# Patient Record
Sex: FEMALE | Race: OTHER | HISPANIC OR LATINO
[De-identification: names, ages, dates, MRNs, and addresses within clinical notes are randomized per-mention and may not be internally consistent; named-entity substitution may affect disease eponyms.]

---

## 2019-04-29 PROBLEM — Z00.00 ENCOUNTER FOR PREVENTIVE HEALTH EXAMINATION: Status: ACTIVE | Noted: 2019-04-29

## 2019-05-02 ENCOUNTER — APPOINTMENT (OUTPATIENT)
Dept: ANTEPARTUM | Facility: CLINIC | Age: 29
End: 2019-05-02

## 2019-05-15 ENCOUNTER — APPOINTMENT (OUTPATIENT)
Dept: ANTEPARTUM | Facility: CLINIC | Age: 29
End: 2019-05-15

## 2019-06-24 ENCOUNTER — APPOINTMENT (OUTPATIENT)
Dept: ANTEPARTUM | Facility: CLINIC | Age: 29
End: 2019-06-24

## 2021-08-09 ENCOUNTER — APPOINTMENT (OUTPATIENT)
Dept: ANTEPARTUM | Facility: CLINIC | Age: 31
End: 2021-08-09
Payer: MEDICAID

## 2021-08-09 ENCOUNTER — APPOINTMENT (OUTPATIENT)
Dept: MATERNAL FETAL MEDICINE | Facility: CLINIC | Age: 31
End: 2021-08-09
Payer: MEDICAID

## 2021-08-09 ENCOUNTER — ASOB RESULT (OUTPATIENT)
Age: 31
End: 2021-08-09

## 2021-08-09 VITALS
SYSTOLIC BLOOD PRESSURE: 112 MMHG | OXYGEN SATURATION: 98 % | WEIGHT: 180 LBS | DIASTOLIC BLOOD PRESSURE: 76 MMHG | HEIGHT: 64 IN | BODY MASS INDEX: 30.73 KG/M2 | HEART RATE: 84 BPM | RESPIRATION RATE: 16 BRPM

## 2021-08-09 DIAGNOSIS — Z80.3 FAMILY HISTORY OF MALIGNANT NEOPLASM OF BREAST: ICD-10-CM

## 2021-08-09 DIAGNOSIS — Z78.9 OTHER SPECIFIED HEALTH STATUS: ICD-10-CM

## 2021-08-09 DIAGNOSIS — O99.212 OBESITY COMPLICATING PREGNANCY, SECOND TRIMESTER: ICD-10-CM

## 2021-08-09 DIAGNOSIS — Z3A.20 20 WEEKS GESTATION OF PREGNANCY: ICD-10-CM

## 2021-08-09 DIAGNOSIS — O09.292 SUPERVISION OF PREGNANCY WITH OTHER POOR REPRODUCTIVE OR OBSTETRIC HISTORY, SECOND TRIMESTER: ICD-10-CM

## 2021-08-09 PROCEDURE — 76817 TRANSVAGINAL US OBSTETRIC: CPT

## 2021-08-09 PROCEDURE — 76805 OB US >/= 14 WKS SNGL FETUS: CPT

## 2021-08-09 PROCEDURE — 99205 OFFICE O/P NEW HI 60 MIN: CPT

## 2021-08-09 RX ORDER — VITAMIN A, VITAMIN C, VITAMIN D-3, VITAMIN E, VITAMIN B-1, VITAMIN B-2, NIACIN, VITAMIN B-6, CALCIUM, IRON, ZINC, COPPER 4000; 120; 400; 22; 1.84; 3; 20; 10; 1; 12; 200; 27; 25; 2 [IU]/1; MG/1; [IU]/1; MG/1; MG/1; MG/1; MG/1; MG/1; MG/1; UG/1; MG/1; MG/1; MG/1; MG/1
27-1 TABLET ORAL
Qty: 30 | Refills: 0 | Status: ACTIVE | COMMUNITY
Start: 2021-07-21

## 2021-08-09 NOTE — DISCUSSION/SUMMARY
[FreeTextEntry1] : She is 20 weeks and 2 days gestation by her last menstrual period dates.\par \par She is overweight and obesity has been associated with a number of maternal complications such as gestational diabetes, pre-eclampsia, thrombophlebitis, labor abnormalities, post-term pregnancies,  delivery, and operative complications. Obesity has been associated with adverse fetal outcomes such as late stillbirth and  deliveries.  Obese women also have a two to three-fold increased incidence in congenital anomalies. There were no major fetal malformations seen during today's fetal anatomy ultrasound examination.  \par \par She presents because of a history of a second trimester spontaneous  at approximately 16 weeks gestation. No sonogram done before the pregnancy loss to confirm gestational age.  She states that she had uterine contractions followed by vaginal bleeding. She does not recall expelling a fetus or placenta. She remembers passing blood and clots per vagina. She then went to [facility] where she was told that she had a spontaneous complete . No D & C done. It is possible that she had a a first trimester spontaneous  with wrong menstrual dates.  \par \par I discussed the multiple causes of miscarriages.  Genetic factors such as chromosomal abnormalities have been reported in approximately 6% of miscarriages.  Trisomies are the most common chromosomal abnormality found in aborted tissue.  Monosomy X or Sim syndrome is the second most common chromosomal abnormality found in aborted tissue.  Neither she nor the father of the baby has had chromosomal determinations.  Chromosomal abnormalities occur in approximately 3 to 6% of couples experiencing recurrent pregnancy loss.  I also discussed anatomical factors, such as cervical insufficiency, maternal MIR exposure and septate uterus. I told her that intrauterine adhesions, leiomyomas, adenomyosis and endometrial polyps have also been associated with pregnancy losses. I discussed endocrine factors and miscarriages.  She was also informed that infections have been associated with pregnancy losses. I also told her that cigarette smoking and obesity have been associated with miscarriages.\par \par I told her that I recommend to not have sexual intercourse or to use a condom during intercourse to reduce the chance of having a miscarriage or a  delivery from ascending vaginal infections. She was advised to limit her activities. I told her to avoid standing for prolonged periods of time, avoid heavy lifting and bending. She was also advised to avoid stress during the course of the pregnancy. I gave her  labor precautions. She was advised to present to the hospital if she developed pelvic pressure, back pain, copious vaginal discharge, leaking of vaginal fluid, vaginal bleeding, abdominal tightening, and abdominal cramping pain. \par \par I told her that most miscarriages are due to chance phenomenon. I told her that approximately 5% of women of reproductive age will have two or more recurrent abortions. I told her that the likelihood of other pregnancies resulting in a spontaneous  is approximately 25 to 30% regardless of the number of previous abortions.  \par \par I recommend a glucola challenge test to screen for gestational diabetes between 24 and 28 weeks gestation. I also recommend the Tdap vaccine between 27 and 36 weeks of gestation to prevent pertussis infection in the  infant. I recommend the flu vaccine during flu season (October through May). She should have serial ultrasounds to evaluate fetal growth and development. \par \par She has not received the COVID-19 vaccine. I told her that I recommend having the COVID-19 vaccine during pregnancy. She was informed that the American College of Obstetricians and Gynecologists (ACOG) and the Society for Maternal Fetal Medicine (SMFM) are recommending that all pregnant women be vaccinated against COVID-19. I discussed the benefits and risks of COVID-19 vaccination. I told her of the reported safety of the COVID-19 vaccines during pregnancy. There is no evidence of adverse maternal or fetal effects from vaccinating pregnant women with COVID-19 vaccine. I discussed the current low vaccination rates and the concerning increase in cases. The data has shown that COVID-19 infection puts pregnant women at increased risk of severe complications and death. More than 95% of hospitalized and/or dying individuals from COVID-19 are unvaccinated. Vaccines are the best chance there is to save lives and end the pandemic. Maternal vaccination may also provide some level of protection to the  through the placental transfer of maternal antibodies. I told her that mild side effects such as pain at the site of injection, fever, muscle pain, joint pain, headaches, fatigue, and other symptoms may be present after vaccination. Tylenol (acetaminophen) is recommended for pregnant women who experience fever or other side effects. These side effects are a normal part of the body´s reaction to the vaccine and the developing of antibodies to protect against the COVID-19 illness. None of the COVID-19 vaccines available in the [place] cause infertility. The [employer] COVID-19 vaccine has a small risk for thrombocytopenia syndrome (TTS) and Guillain-Barré syndrome. Therefore, I believe that vaccination with the mRNA vaccines is preferable. There are rare allergic reactions (including anaphylaxis), 4.7 per million for Pfizer-Juxinliech and 2.5 per million for Moderna following COVID-19 vaccination in nonpregnant individuals. Management of anaphylaxis in pregnant individuals is the same as in nonpregnant individuals. She told me that she is going to request to have the COVID 19 vaccine.\par

## 2021-08-09 NOTE — VITALS
[LMP (date): ___] : LMP was on [unfilled] [GA =___ Weeks] : which calculates to a GA of [unfilled] weeks [GA= ___ Days] : and [unfilled] day(s) [RONY by LMP (date): ___] : The calculated RONY by LMP is [unfilled] [By LMP] : this is the final RONY

## 2021-08-09 NOTE — OB HISTORY
[LMP: ___] : LMP: [unfilled] [RONY: ___] : RONY: [unfilled] [EGA: ___ wks] : EGA: [unfilled] wks [Spontaneous] : Spontaneous conception [Sonogram] : sonogram [at ___ wks] : at [unfilled] weeks [Definite:  ___ (Date)] : the last menstrual period was [unfilled] [Normal Amount/Duration] : was of a normal amount and duration [Regular Cycle Intervals] : periods have been regular [Frequency: Q ___ days] : menstrual periods occur approximately every [unfilled] days [Menarche Age: ____] : age at menarche was [unfilled] [Pregnancy History] : patient did not receive anesthesia [___] : at [unfilled] weeks GA [FreeTextEntry1] : Maternal serum alpha-fetoprotein done on August 4, 2021 was reported to be within normal limits. [Spotting Between  Menses] : no spotting between menses [Menstrual Cramps] : no menstrual cramps [On BCP at conception] : the patient was not on BCP at conception

## 2021-08-09 NOTE — FAMILY HISTORY
[Age 35+ During Pregnancy] : not 35 or over during pregnancy [Reported Family History Of Birth Defects] : no congenital heart defects [Alfredo-Sachs Carrier] : no Alfredo-Sachs [Family History] : no mental retardation/autism [Reported Family History Of Genetic Disease] : no history of child defect in child of baby father

## 2021-08-25 ENCOUNTER — APPOINTMENT (OUTPATIENT)
Dept: ANTEPARTUM | Facility: CLINIC | Age: 31
End: 2021-08-25
Payer: MEDICAID

## 2021-08-25 ENCOUNTER — ASOB RESULT (OUTPATIENT)
Age: 31
End: 2021-08-25

## 2021-08-25 ENCOUNTER — APPOINTMENT (OUTPATIENT)
Dept: ANTEPARTUM | Facility: CLINIC | Age: 31
End: 2021-08-25

## 2021-08-25 PROCEDURE — 76816 OB US FOLLOW-UP PER FETUS: CPT

## 2021-10-06 ENCOUNTER — APPOINTMENT (OUTPATIENT)
Dept: ANTEPARTUM | Facility: CLINIC | Age: 31
End: 2021-10-06
Payer: MEDICAID

## 2021-10-06 ENCOUNTER — ASOB RESULT (OUTPATIENT)
Age: 31
End: 2021-10-06

## 2021-10-06 PROCEDURE — 76816 OB US FOLLOW-UP PER FETUS: CPT

## 2021-10-13 ENCOUNTER — APPOINTMENT (OUTPATIENT)
Dept: ANTEPARTUM | Facility: CLINIC | Age: 31
End: 2021-10-13

## 2021-10-20 ENCOUNTER — APPOINTMENT (OUTPATIENT)
Dept: ANTEPARTUM | Facility: CLINIC | Age: 31
End: 2021-10-20

## 2021-10-27 ENCOUNTER — APPOINTMENT (OUTPATIENT)
Dept: ANTEPARTUM | Facility: CLINIC | Age: 31
End: 2021-10-27

## 2021-11-03 ENCOUNTER — ASOB RESULT (OUTPATIENT)
Age: 31
End: 2021-11-03

## 2021-11-03 ENCOUNTER — APPOINTMENT (OUTPATIENT)
Dept: ANTEPARTUM | Facility: CLINIC | Age: 31
End: 2021-11-03
Payer: MEDICAID

## 2021-11-03 PROCEDURE — 76816 OB US FOLLOW-UP PER FETUS: CPT

## 2021-11-03 PROCEDURE — 76819 FETAL BIOPHYS PROFIL W/O NST: CPT

## 2021-12-01 ENCOUNTER — ASOB RESULT (OUTPATIENT)
Age: 31
End: 2021-12-01

## 2021-12-01 ENCOUNTER — APPOINTMENT (OUTPATIENT)
Dept: ANTEPARTUM | Facility: CLINIC | Age: 31
End: 2021-12-01
Payer: MEDICAID

## 2021-12-01 PROCEDURE — 76816 OB US FOLLOW-UP PER FETUS: CPT

## 2021-12-01 PROCEDURE — 76819 FETAL BIOPHYS PROFIL W/O NST: CPT

## 2021-12-08 ENCOUNTER — APPOINTMENT (OUTPATIENT)
Dept: ANTEPARTUM | Facility: CLINIC | Age: 31
End: 2021-12-08
Payer: MEDICAID

## 2021-12-08 ENCOUNTER — ASOB RESULT (OUTPATIENT)
Age: 31
End: 2021-12-08

## 2021-12-08 PROCEDURE — 76818 FETAL BIOPHYS PROFILE W/NST: CPT

## 2021-12-15 ENCOUNTER — ASOB RESULT (OUTPATIENT)
Age: 31
End: 2021-12-15

## 2021-12-15 ENCOUNTER — APPOINTMENT (OUTPATIENT)
Dept: ANTEPARTUM | Facility: CLINIC | Age: 31
End: 2021-12-15
Payer: MEDICAID

## 2021-12-15 PROCEDURE — 76818 FETAL BIOPHYS PROFILE W/NST: CPT

## 2021-12-22 ENCOUNTER — APPOINTMENT (OUTPATIENT)
Dept: ANTEPARTUM | Facility: CLINIC | Age: 31
End: 2021-12-22

## 2022-06-22 ENCOUNTER — ASOB RESULT (OUTPATIENT)
Age: 32
End: 2022-06-22

## 2022-06-22 ENCOUNTER — APPOINTMENT (OUTPATIENT)
Dept: ANTEPARTUM | Facility: CLINIC | Age: 32
End: 2022-06-22
Payer: MEDICAID

## 2022-06-22 PROCEDURE — 76817 TRANSVAGINAL US OBSTETRIC: CPT
